# Patient Record
Sex: MALE | Race: WHITE
[De-identification: names, ages, dates, MRNs, and addresses within clinical notes are randomized per-mention and may not be internally consistent; named-entity substitution may affect disease eponyms.]

---
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
## 2018-01-05 ENCOUNTER — HOSPITAL ENCOUNTER (EMERGENCY)
Dept: HOSPITAL 8 - ED | Age: 18
Discharge: LEFT BEFORE DISCHARGE | End: 2018-01-05
Payer: SELF-PAY

## 2018-01-05 VITALS — DIASTOLIC BLOOD PRESSURE: 74 MMHG | SYSTOLIC BLOOD PRESSURE: 107 MMHG

## 2018-01-05 VITALS — HEIGHT: 67 IN | WEIGHT: 115.74 LBS | BODY MASS INDEX: 18.17 KG/M2

## 2018-01-05 DIAGNOSIS — R10.9: Primary | ICD-10-CM

## 2018-01-05 LAB
ALBUMIN SERPL-MCNC: 3.9 G/DL (ref 3.4–5)
ALP SERPL-CCNC: 83 U/L (ref 45–117)
ALT SERPL-CCNC: 20 U/L (ref 12–78)
ANION GAP SERPL CALC-SCNC: 10 MMOL/L (ref 5–15)
BASOPHILS # BLD AUTO: 0.04 X10^3/UL (ref 0–0.3)
BASOPHILS NFR BLD AUTO: 1 % (ref 0–1)
BILIRUB SERPL-MCNC: 0.5 MG/DL (ref 0.2–1)
CALCIUM SERPL-MCNC: 8.6 MG/DL (ref 8.5–10.1)
CHLORIDE SERPL-SCNC: 99 MMOL/L (ref 98–107)
CREAT SERPL-MCNC: 1.14 MG/DL (ref 0.7–1.3)
EOSINOPHIL # BLD AUTO: 0 X10^3/UL (ref 0–0.8)
EOSINOPHIL NFR BLD AUTO: 0 % (ref 1–7)
ERYTHROCYTE [DISTWIDTH] IN BLOOD BY AUTOMATED COUNT: 12.6 % (ref 9.4–14.8)
LYMPHOCYTES # BLD AUTO: 1.49 X10^3/UL (ref 1–6.1)
LYMPHOCYTES NFR BLD AUTO: 43 % (ref 22–44)
MCH RBC QN AUTO: 31.5 PG (ref 27.5–34.5)
MCHC RBC AUTO-ENTMCNC: 34.5 G/DL (ref 33.2–36.2)
MCV RBC AUTO: 91.3 FL (ref 81–97)
MD: NO
MONOCYTES # BLD AUTO: 0.33 X10^3/UL (ref 0–1.4)
MONOCYTES NFR BLD AUTO: 9 % (ref 2–9)
NEUTROPHILS # BLD AUTO: 1.63 X10^3/UL (ref 1.8–8)
NEUTROPHILS NFR BLD AUTO: 47 % (ref 42–75)
PLATELET # BLD AUTO: 210 X10^3/UL (ref 130–400)
PMV BLD AUTO: 8 FL (ref 7.4–10.4)
PROT SERPL-MCNC: 7.8 G/DL (ref 6.4–8.2)
RBC # BLD AUTO: 5.4 X10^6/UL (ref 4.38–5.82)

## 2018-01-05 PROCEDURE — 99284 EMERGENCY DEPT VISIT MOD MDM: CPT

## 2018-01-05 PROCEDURE — 83690 ASSAY OF LIPASE: CPT

## 2018-01-05 PROCEDURE — 85025 COMPLETE CBC W/AUTO DIFF WBC: CPT

## 2018-01-05 PROCEDURE — 36415 COLL VENOUS BLD VENIPUNCTURE: CPT

## 2018-01-05 PROCEDURE — 80053 COMPREHEN METABOLIC PANEL: CPT

## 2019-06-24 ENCOUNTER — HOSPITAL ENCOUNTER (EMERGENCY)
Facility: MEDICAL CENTER | Age: 19
End: 2019-06-24
Attending: EMERGENCY MEDICINE

## 2019-06-24 VITALS
RESPIRATION RATE: 16 BRPM | TEMPERATURE: 97.7 F | BODY MASS INDEX: 19.91 KG/M2 | HEART RATE: 90 BPM | DIASTOLIC BLOOD PRESSURE: 70 MMHG | OXYGEN SATURATION: 94 % | WEIGHT: 119.49 LBS | HEIGHT: 65 IN | SYSTOLIC BLOOD PRESSURE: 110 MMHG

## 2019-06-24 DIAGNOSIS — J02.9 PHARYNGITIS, UNSPECIFIED ETIOLOGY: ICD-10-CM

## 2019-06-24 LAB
S PYO AG THROAT QL: NORMAL
SIGNIFICANT IND 70042: NORMAL
SITE SITE: NORMAL
SOURCE SOURCE: NORMAL

## 2019-06-24 PROCEDURE — 87880 STREP A ASSAY W/OPTIC: CPT

## 2019-06-24 PROCEDURE — 87081 CULTURE SCREEN ONLY: CPT

## 2019-06-24 PROCEDURE — 99284 EMERGENCY DEPT VISIT MOD MDM: CPT

## 2019-06-24 PROCEDURE — 700102 HCHG RX REV CODE 250 W/ 637 OVERRIDE(OP): Performed by: EMERGENCY MEDICINE

## 2019-06-24 PROCEDURE — A9270 NON-COVERED ITEM OR SERVICE: HCPCS | Performed by: EMERGENCY MEDICINE

## 2019-06-24 RX ORDER — IBUPROFEN 200 MG
400 TABLET ORAL EVERY 6 HOURS PRN
Status: SHIPPED | COMMUNITY
End: 2020-09-20

## 2019-06-24 RX ORDER — IBUPROFEN 600 MG/1
600 TABLET ORAL ONCE
Status: COMPLETED | OUTPATIENT
Start: 2019-06-24 | End: 2019-06-24

## 2019-06-24 RX ADMIN — IBUPROFEN 600 MG: 600 TABLET ORAL at 15:22

## 2019-06-24 NOTE — ED TRIAGE NOTES
Chief Complaint   Patient presents with   • Flu Like Symptoms     2-3 days of a sore throat, stiff neck and fevers

## 2019-06-24 NOTE — ED NOTES
Patient in stable condition. No signs of distress. Patient pain free. Patient ambulatory out of ED to personal vehicle with stable gait with family.

## 2019-06-24 NOTE — ED PROVIDER NOTES
"ED Provider Note    CHIEF COMPLAINT  Chief Complaint   Patient presents with   • Flu Like Symptoms     2-3 days of a sore throat, stiff neck and fevers       HPI  Kennedy Stanton is a 18 y.o. male who presents with a report of 3 days of sore throat, somewhat stiff neck and occasional subjective fevers.  There is been no photophobia, vomiting or headache of significance.  Patient presents with a heart rate of 91 and says that he otherwise feels pretty good.  Denies any other complaints such as runny nose or congestion    REVIEW OF SYSTEMS  See HPI for further details. All other systems are negative.     PAST MEDICAL HISTORY  No past medical history on file.    FAMILY HISTORY  Family History   Problem Relation Age of Onset   • Arthritis Mother         RA   • No Known Problems Father    • No Known Problems Brother    • No Known Problems Brother        SOCIAL HISTORY   reports that he has never smoked. He uses smokeless tobacco. He reports that he does not drink alcohol or use drugs.    SURGICAL HISTORY  Past Surgical History:   Procedure Laterality Date   • COLONOSCOPY  9 y.o.    due to rectal bleeding - no abnormality found       CURRENT MEDICATIONS  Home Medications    **Home medications have not yet been reviewed for this encounter**         ALLERGIES  Allergies   Allergen Reactions   • Penicillins        PHYSICAL EXAM  VITAL SIGNS: /79   Pulse 91   Temp 37.1 °C (98.7 °F) (Temporal)   Resp 16   Ht 1.651 m (5' 5\")   Wt 54.2 kg (119 lb 7.8 oz)   SpO2 94%   BMI 19.88 kg/m²    Constitutional: Well developed, Well nourished, No acute distress, Non-toxic appearance.   HENT: Normocephalic, Atraumatic, Bilateral external ears normal, Oropharynx is mildly erythematous with clear mucous membranes are moist. No oral exudates or nasal discharge.   Eyes: Pupils are equal round and reactive, EOMI, Conjunctiva normal, No discharge.   Neck: Normal range of motion, minimal tenderness, Supple, No stridor. No " meningismus.  Lymphatic: No submandibular lymphadenopathy noted.   Cardiovascular: Regular rate and rhythm without murmur rub or gallop.  Thorax & Lungs: Clear breath sounds bilaterally without wheezes, rhonchi or rales. There is no chest wall tenderness.   Abdomen: Soft non-tender non-distended. There is no rebound or guarding. No organomegaly is appreciated. Bowel sounds are normal.  Skin: Normal without rash.   Back: No CVA or spinal tenderness.   Extremities: Intact distal pulses, No edema, No tenderness, No cyanosis, No clubbing. Capillary refill is less than 2 seconds.  Musculoskeletal: Good range of motion in all major joints. No tenderness to palpation or major deformities noted.   Neurologic: Alert & oriented x 3, Normal motor function, Normal sensory function, No focal deficits noted. Reflexes are normal.  Psychiatric: Affect normal, Judgment normal, Mood normal. There is no suicidal ideation or patient reported hallucinations.       COURSE & MEDICAL DECISION MAKING  Pertinent Labs & Imaging studies reviewed. (See chart for details)  Patient has a pharyngitis of questionable etiology.  I doubt retropharyngeal abscess or meningitis based on presentation.  The patient is fairly comfortable.  I ordered 600 mg of ibuprofen which seemed to take the edge of his sore throat.  I did obtain a rapid strep which is negative and this is sent for culture.  I think he has a viral pharyngitis which should self resolve and the patient and his grandmother understand that antibiotics are not indicated.  He will copiously hydrated, salt water gargle, ibuprofen for pain return if any significant change in symptoms    FINAL IMPRESSION  1. Pharyngitis, unspecified etiology             Electronically signed by: Stephan Gandhi, 6/24/2019 3:06 PM

## 2019-06-24 NOTE — ED NOTES
Med rec updated and complete  Allergies reviewed  Interviewed pt with grandmother at bedside with permission from pt.  Pt reports no prescription medications.  Pt reports no antibiotics in the last 2 weeks.

## 2019-06-27 LAB
S PYO SPEC QL CULT: NORMAL
SIGNIFICANT IND 70042: NORMAL
SITE SITE: NORMAL
SOURCE SOURCE: NORMAL

## 2020-09-20 ENCOUNTER — APPOINTMENT (OUTPATIENT)
Dept: RADIOLOGY | Facility: MEDICAL CENTER | Age: 20
End: 2020-09-20
Attending: EMERGENCY MEDICINE

## 2020-09-20 ENCOUNTER — HOSPITAL ENCOUNTER (EMERGENCY)
Facility: MEDICAL CENTER | Age: 20
End: 2020-09-20
Attending: EMERGENCY MEDICINE

## 2020-09-20 VITALS
HEART RATE: 81 BPM | OXYGEN SATURATION: 100 % | DIASTOLIC BLOOD PRESSURE: 62 MMHG | TEMPERATURE: 97.7 F | RESPIRATION RATE: 16 BRPM | SYSTOLIC BLOOD PRESSURE: 100 MMHG

## 2020-09-20 DIAGNOSIS — S90.32XA CONTUSION OF LEFT FOOT, INITIAL ENCOUNTER: ICD-10-CM

## 2020-09-20 PROCEDURE — 73630 X-RAY EXAM OF FOOT: CPT | Mod: LT

## 2020-09-20 PROCEDURE — 99284 EMERGENCY DEPT VISIT MOD MDM: CPT

## 2020-09-20 RX ORDER — LORATADINE 10 MG/1
10 TABLET ORAL PRN
Status: SHIPPED | COMMUNITY
End: 2021-08-21

## 2020-09-20 NOTE — ED TRIAGE NOTES
Chief Complaint   Patient presents with   • Foot Injury      pt states he ran over his foot with a power generator 2 days ago. complains of pain on left ankle. Swelling noted, no open wounds. CMS intact. Unable to bear weight.     Negative covid screen.

## 2020-09-20 NOTE — ED PROVIDER NOTES
ED Provider Note    ED Provider    Means of arrival: Private vehicle  History obtained from: Patient  History limited by: None    CHIEF COMPLAINT  Chief Complaint   Patient presents with   • Foot Injury       HPI  Kennedy Stanton is a 19 y.o. male who presents with complaints of left foot pain.  Yesterday he was walking down a hill pulling a generator behind him and the generator ran over his foot.  Complains of pain on the medial aspect of the left foot, just above the arch.  Pain is worse with weightbearing.  He describes tingling of the foot.    REVIEW OF SYSTEMS  See HPI for further details.     PAST MEDICAL HISTORY       SOCIAL HISTORY  Social History     Tobacco Use   • Smoking status: Never Smoker   • Smokeless tobacco: Current User   • Tobacco comment: vapor   Substance and Sexual Activity   • Alcohol use: No     Alcohol/week: 0.0 oz   • Drug use: No   • Sexual activity: Never       SURGICAL HISTORY   has a past surgical history that includes colonoscopy (9 y.o.).    CURRENT MEDICATIONS  Home Medications     Reviewed by Alexis White (Pharmacy Tech) on 09/20/20 at 1612  Med List Status: Complete   Medication Last Dose Status   loratadine (CLARITIN) 10 MG Tab 9/19/2020 Active                ALLERGIES  Allergies   Allergen Reactions   • Penicillins Rash     Pt reports that he gets a rash all over his body       PHYSICAL EXAM  VITAL SIGNS: /62   Pulse 81   Temp 36.5 °C (97.7 °F) (Temporal)   Resp 16   SpO2 100%   Constitutional: Alert in no apparent distress.  HENT: Normocephalic, Atraumatic, Mucous membranes are moist  Eyes:  Conjunctiva normal, non-icteric.   Heart: no peripheral cyanosis  Lungs: respirations are even and unlabored  Skin: Warm, Dry,normal color  Neurologic: Alert, Grossly non-focal.   Psychiatric: Affect normal, Judgment normal, Mood normal, Appears appropriate and not intoxicated.   Extremity: Left foot has ecchymosis and tenderness at the medial aspect just  above the arch.  There is no tenderness on the malleoli.  Distal neurovascular is normal  MEDICAL DECISION MAKING  This is a 19 y.o. male who presents injury to left foot, there is no deformity, x-rays to be done to evaluate for fracture, or dislocation    DIAGNOSTIC STUDIES / PROCEDURES    LABS      RADIOLOGY  DX-FOOT-COMPLETE 3+ LEFT   Final Result      No acute osseous abnormality.      X-ray was independently visualized by me    COURSE  Pertinent Labs & Imaging studies reviewed. (See chart for details)    4:06 PM - Patient seen and examined at bedside. Discussed plan of care,        FINAL IMPRESSION  1. Contusion of left foot, initial encounter        The note accurately reflects work and decisions made by me.  Burke Loza D.O.  9/20/2020  5:35 PM

## 2020-09-21 NOTE — ED NOTES
Dc instructions reviewed with pt, to f/u with pcp, , ice, elevate and use otc meds for pain /swelling. Return for worsening s/s

## 2021-08-21 ENCOUNTER — OFFICE VISIT (OUTPATIENT)
Dept: URGENT CARE | Facility: CLINIC | Age: 21
End: 2021-08-21

## 2021-08-21 VITALS
HEIGHT: 67 IN | WEIGHT: 122 LBS | OXYGEN SATURATION: 100 % | TEMPERATURE: 96.9 F | SYSTOLIC BLOOD PRESSURE: 106 MMHG | DIASTOLIC BLOOD PRESSURE: 76 MMHG | RESPIRATION RATE: 16 BRPM | HEART RATE: 70 BPM | BODY MASS INDEX: 19.15 KG/M2

## 2021-08-21 DIAGNOSIS — M62.838 MUSCLE SPASMS OF NECK: ICD-10-CM

## 2021-08-21 PROBLEM — S62.009A FRACTURE OF SCAPHOID BONE OF WRIST: Status: ACTIVE | Noted: 2017-06-15

## 2021-08-21 PROCEDURE — 99203 OFFICE O/P NEW LOW 30 MIN: CPT | Performed by: PHYSICIAN ASSISTANT

## 2021-08-21 ASSESSMENT — ENCOUNTER SYMPTOMS
DOUBLE VISION: 0
SEIZURES: 0
COUGH: 0
NAUSEA: 0
FEVER: 0
VOMITING: 0
BLURRED VISION: 0
SPEECH CHANGE: 0
FOCAL WEAKNESS: 0
SENSORY CHANGE: 0
SHORTNESS OF BREATH: 0
WEAKNESS: 0
PALPITATIONS: 0
ABDOMINAL PAIN: 0
CHILLS: 0
TREMORS: 0
HEADACHES: 0
TINGLING: 0
CLAUDICATION: 0
DIZZINESS: 0
NECK PAIN: 1
LOSS OF CONSCIOUSNESS: 0
DIARRHEA: 0
ORTHOPNEA: 0

## 2021-08-21 NOTE — PROGRESS NOTES
Subjective:   Kennedy Stanton is a 20 y.o. male who presents for Neck Injury (x 1 hour, patient felt pop in throat this morning while smoking, instantaneous 6/10 pain that radiated up into ear, 10/10 pain with talking or swallowing, pain has subsided somewhat, tenderness on palpation in area)      Kennedy Stanton is a 20 y.o. male who presents for neck pain x 1 hour, patient felt pop in throat this morning while smoking, instantaneous 6/10 pain that radiated up into ear, 10/10 pain with talking or swallowing, pain has subsided somewhat, tenderness on palpation in area.      Review of Systems   Constitutional: Negative for chills and fever.   Eyes: Negative for blurred vision and double vision.   Respiratory: Negative for cough and shortness of breath.    Cardiovascular: Negative for chest pain, palpitations, orthopnea, claudication and leg swelling.   Gastrointestinal: Negative for abdominal pain, diarrhea, nausea and vomiting.   Musculoskeletal: Positive for neck pain.   Skin: Negative for rash.   Neurological: Negative for dizziness, tingling, tremors, sensory change, speech change, focal weakness, seizures, loss of consciousness, weakness and headaches.   All other systems reviewed and are negative.      Medications:    • This patient does not have an active medication from one of the medication groupers.    Allergies: Penicillins    Problem List: Kennedy Stanton does not have a problem list on file.    Surgical History:  Past Surgical History:   Procedure Laterality Date   • COLONOSCOPY  9 y.o.    due to rectal bleeding - no abnormality found       Past Social Hx: Kennedy Stanton  reports that he has never smoked. He uses smokeless tobacco. He reports that he does not drink alcohol and does not use drugs.     Past Family Hx:  Kennedy Stanton family history includes Arthritis in his mother; No Known Problems in his brother, brother, and father.  "    Problem list, medications, and allergies reviewed by myself today in Epic.     Objective:     Blood Pressure 106/76 (BP Location: Right arm, Patient Position: Sitting, BP Cuff Size: Adult)   Pulse 70   Temperature 36.1 °C (96.9 °F) (Temporal)   Respiration 16   Height 1.702 m (5' 7\")   Weight 55.3 kg (122 lb)   Oxygen Saturation 100%   Body Mass Index 19.11 kg/m²     Physical Exam  Vitals reviewed.   Constitutional:       General: He is not in acute distress.     Appearance: Normal appearance. He is not ill-appearing, toxic-appearing or diaphoretic.   HENT:      Head: Normocephalic and atraumatic.   Neck:      Vascular: No carotid bruit.      Comments: PTP of the left mid sternal clinoid mastoid muscle.  No focal midline tenderness of the cervical spine.  No step-offs appreciated.  Flexion, extension, lateral bend and rotation is limited due to pain.  Cardiovascular:      Rate and Rhythm: Normal rate.      Heart sounds: Normal heart sounds.   Pulmonary:      Effort: Pulmonary effort is normal.   Musculoskeletal:         General: Normal range of motion.      Cervical back: Normal range of motion. No rigidity. Muscular tenderness present.   Lymphadenopathy:      Cervical: No cervical adenopathy.   Skin:     General: Skin is warm.      Findings: No rash.   Neurological:      General: No focal deficit present.      Mental Status: He is alert and oriented to person, place, and time. Mental status is at baseline.      Cranial Nerves: No cranial nerve deficit.      Sensory: No sensory deficit.      Motor: No weakness.      Coordination: Coordination normal.      Gait: Gait normal.      Deep Tendon Reflexes: Reflexes normal.      Comments: Motor and sensory of C-Spine    Deltoid/biceps brachii(C5): Intact  Radial wrist extensor(C6): Intact  Triceps brachii/flexor carpi radialis(C7): Intact  Flexor Digitorum Superficialis (C8): Intact    Special Test    Brudzinski-Kernig: N/A  Spurling: N/A   Psychiatric:         " Mood and Affect: Mood normal.         Behavior: Behavior normal.         Thought Content: Thought content normal.         Judgment: Judgment normal.         Assessment/Plan:     Medical Decision Making/Comments     Patient is a 20-year-old male who presents for evaluation of acute neck pain.  He states that his neck was contorted in an awkward position while he was sleeping and felt immediate pain in his left side of his neck.  Pain radiated to his left ear.  He denies any syncope, headache change in vision.  Pain has receded significantly since the incident.  On exam he has palpable pain of the sternocleidomastoid muscle with good range of motion.  No carotid bruits were appreciated.  No emphysema under the skin was appreciated.  Likely muscle spasm.  ED precautions discussed.   Diagnosis and associated orders     1. Muscle spasms of neck       -Protection/compression  -Rest: activity as tolerated  -Ice: Ice first 3-7 days.  20 min off/on  -Elevation  -NSAIDs/Acetomenophen as needed             Differential diagnosis, natural history, supportive care, and indications for immediate follow-up discussed.    Advised the patient to follow-up with the primary care physician for recheck, reevaluation, and consideration of further management.    Please note that this dictation was created using voice recognition software. I have made a reasonable attempt to correct obvious errors, but I expect that there are errors of grammar and possibly content that I did not discover before finalizing the note.

## 2022-01-31 ENCOUNTER — OFFICE VISIT (OUTPATIENT)
Dept: URGENT CARE | Facility: CLINIC | Age: 22
End: 2022-01-31
Payer: COMMERCIAL

## 2022-01-31 VITALS
DIASTOLIC BLOOD PRESSURE: 58 MMHG | BODY MASS INDEX: 19.46 KG/M2 | OXYGEN SATURATION: 98 % | HEIGHT: 67 IN | WEIGHT: 124 LBS | RESPIRATION RATE: 16 BRPM | SYSTOLIC BLOOD PRESSURE: 110 MMHG | TEMPERATURE: 99.2 F | HEART RATE: 94 BPM

## 2022-01-31 DIAGNOSIS — R94.31 NONSPECIFIC ABNORMAL ELECTROCARDIOGRAM (ECG) (EKG): ICD-10-CM

## 2022-01-31 DIAGNOSIS — F41.9 ANXIETY: ICD-10-CM

## 2022-01-31 DIAGNOSIS — G47.9 DIFFICULTY SLEEPING: ICD-10-CM

## 2022-01-31 DIAGNOSIS — R00.0 RACING HEART BEAT: ICD-10-CM

## 2022-01-31 PROCEDURE — 93000 ELECTROCARDIOGRAM COMPLETE: CPT | Performed by: PHYSICIAN ASSISTANT

## 2022-01-31 PROCEDURE — 99215 OFFICE O/P EST HI 40 MIN: CPT | Performed by: PHYSICIAN ASSISTANT

## 2022-01-31 RX ORDER — HYDROXYZINE HYDROCHLORIDE 25 MG/1
25 TABLET, FILM COATED ORAL NIGHTLY
Qty: 30 TABLET | Refills: 0 | Status: SHIPPED | OUTPATIENT
Start: 2022-01-31 | End: 2022-03-02

## 2022-01-31 ASSESSMENT — ANXIETY QUESTIONNAIRES
3. WORRYING TOO MUCH ABOUT DIFFERENT THINGS: MORE THAN HALF THE DAYS
1. FEELING NERVOUS, ANXIOUS, OR ON EDGE: MORE THAN HALF THE DAYS
7. FEELING AFRAID AS IF SOMETHING AWFUL MIGHT HAPPEN: NEARLY EVERY DAY
2. NOT BEING ABLE TO STOP OR CONTROL WORRYING: MORE THAN HALF THE DAYS
4. TROUBLE RELAXING: NEARLY EVERY DAY
IF YOU CHECKED OFF ANY PROBLEMS ON THIS QUESTIONNAIRE, HOW DIFFICULT HAVE THESE PROBLEMS MADE IT FOR YOU TO DO YOUR WORK, TAKE CARE OF THINGS AT HOME, OR GET ALONG WITH OTHER PEOPLE: SOMEWHAT DIFFICULT
5. BEING SO RESTLESS THAT IT IS HARD TO SIT STILL: NOT AT ALL
6. BECOMING EASILY ANNOYED OR IRRITABLE: MORE THAN HALF THE DAYS
GAD7 TOTAL SCORE: 14

## 2022-02-01 ASSESSMENT — ENCOUNTER SYMPTOMS
DEPRESSION: 0
DIZZINESS: 0
COUGH: 0
NERVOUS/ANXIOUS: 1
PALPITATIONS: 1
HEADACHES: 0
INSOMNIA: 1
SHORTNESS OF BREATH: 1

## 2022-02-01 NOTE — PROGRESS NOTES
"Subjective:   Kennedy Stanton is a 21 y.o. male who presents for Muscle Pain (x 1 week, had a back spasm, dx with anxiety, feels that he cannot breathe, shaky as he was sleeping  x 2 days)        Patient presents with concerns of anxiety, difficulty sleeping, and intermittent episodes of racing heart/muscle spasms/chest pain that have been ongoing for the last 7 to 10 days.  States his anxiety started after he experienced a back spasm, rapid heart rate, and shortness of breath suddenly at work.  He was checked out by paramedics onsite and was told his EKG was within normal limits.  His vital signs were stable at the time and he declined transport to the ER.  He had a similar episode 2 days ago that woke him from sleep.  He states that he felt a \"jolt\" in his chest.  Symptoms passed after a couple minutes.  Since then he has been experiencing anxiety that another episode will occur and has been experiencing difficulty sleeping.  He has difficulty both falling asleep and staying asleep.  He estimates that he is sleeping 6 hours per night.  Denies similar symptoms in the past and denies history of cardiac issues and thyroid issues.  He does have upcoming appointment with new PCP next week.  He is not taking any medications for symptoms.  He has been trying to improve his sleep habits by cutting down on screen time prior to bed-this is not helping.    Review of Systems   Respiratory: Positive for shortness of breath (currently resolved). Negative for cough.    Cardiovascular: Positive for chest pain (currently resolved) and palpitations (currently resolved). Negative for leg swelling.   Neurological: Negative for dizziness and headaches.   Psychiatric/Behavioral: Negative for depression and suicidal ideas. The patient is nervous/anxious and has insomnia.        PMH:  has no past medical history on file.  MEDS:   Current Outpatient Medications:   •  hydrOXYzine HCl (ATARAX) 25 MG Tab, Take 1 Tablet by mouth " "every evening for 30 days., Disp: 30 Tablet, Rfl: 0  ALLERGIES:   Allergies   Allergen Reactions   • Penicillins Rash     Pt reports that he gets a rash all over his body     SURGHX:   Past Surgical History:   Procedure Laterality Date   • COLONOSCOPY  9 y.o.    due to rectal bleeding - no abnormality found     SOCHX:  reports that he has never smoked. He uses smokeless tobacco. He reports that he does not drink alcohol and does not use drugs.  FH: Family history was reviewed, no pertinent findings to report   Objective:   /58 (BP Location: Left arm, Patient Position: Sitting, BP Cuff Size: Adult)   Pulse 94   Temp 37.3 °C (99.2 °F) (Temporal)   Resp 16   Ht 1.702 m (5' 7\")   Wt 56.2 kg (124 lb)   SpO2 98%   BMI 19.42 kg/m²   Physical Exam  Vitals reviewed.   Constitutional:       General: He is not in acute distress.     Appearance: Normal appearance. He is well-developed. He is not toxic-appearing.   HENT:      Head: Normocephalic and atraumatic.      Right Ear: External ear normal.      Left Ear: External ear normal.      Nose: Nose normal.   Cardiovascular:      Rate and Rhythm: Normal rate and regular rhythm.      Heart sounds: Normal heart sounds, S1 normal and S2 normal.   Pulmonary:      Effort: Pulmonary effort is normal. No respiratory distress.      Breath sounds: Normal breath sounds. No stridor. No decreased breath sounds, wheezing, rhonchi or rales.   Musculoskeletal:      Right lower leg: No edema.      Left lower leg: No edema.   Skin:     General: Skin is dry.   Neurological:      Comments: Alert and oriented.    Psychiatric:         Speech: Speech normal.         Behavior: Behavior normal.           EKG:  Comparison:   Rhythm: Sinus rhythm  Ectopy: None  Rate: 69  QRS Axis: Normal  Conduction:  Borderline shortened TN interval.  Notching at J-point in 2 3 and aVF.  ST segment: No ST segment elevation or depression noted  T waves: WNL      Assessment/Plan:   1. Nonspecific abnormal " electrocardiogram (ECG) (EKG)  - REFERRAL TO CARDIOLOGY    2. Racing heart beat  - EKG - Clinic Performed  - CBC WITH DIFFERENTIAL; Future  - Comp Metabolic Panel; Future  - TSH WITH REFLEX TO FT4; Future  - REFERRAL TO CARDIOLOGY    3. Anxiety  - CBC WITH DIFFERENTIAL; Future  - Comp Metabolic Panel; Future  - TSH WITH REFLEX TO FT4; Future  - hydrOXYzine HCl (ATARAX) 25 MG Tab; Take 1 Tablet by mouth every evening for 30 days.  Dispense: 30 Tablet; Refill: 0    4. Difficulty sleeping  - CBC WITH DIFFERENTIAL; Future  - Comp Metabolic Panel; Future  - TSH WITH REFLEX TO FT4; Future  - hydrOXYzine HCl (ATARAX) 25 MG Tab; Take 1 Tablet by mouth every evening for 30 days.  Dispense: 30 Tablet; Refill: 0    1. I am not convinced that this is the primary anxiety issue although this consideration was discussed.  Patient's anxiety began following episodes of palpitation, muscle spasm, shortness of breath.  One of these episodes woke him up from sleep.  His anxiety seems to primarily be stemming from concern that another one of these episodes will occur again.  I am concerned that he may be going into an arrhythmia during these episodes.  EKG demonstrates notching at the J-point in leads II, III and aVF-possible early repolarization syndrome?  Also referred to cardiology for further evaluation and management.  If Patient develops another episode I do recommend that he go to the emergency room for immediate medical reevaluation.    2.  DALLAS-7 score 14.  Again anxiety seems or associated with patient's acute episodes as previously detailed.  He does have follow-up with new PCP on 2/10/2022.  I would like him to be reevaluated at this appointment.  We will try him on Vistaril at nighttime for anxiety/sleep.  Discussed good sleep hygiene and continuing to avoid nighttime screen time. Labs to evaluate for underlying metabolic cause- review with PCP at upcoming appt.    Differential diagnosis, natural history, supportive care,  and indications for immediate follow-up discussed.    My total time spent caring for the patient on the day of the encounter was 45 minutes.   This does not include time spent on separately billable procedures/tests.

## 2022-02-02 ENCOUNTER — HOSPITAL ENCOUNTER (OUTPATIENT)
Dept: LAB | Facility: MEDICAL CENTER | Age: 22
End: 2022-02-02
Attending: PHYSICIAN ASSISTANT
Payer: COMMERCIAL

## 2022-02-02 DIAGNOSIS — G47.9 DIFFICULTY SLEEPING: ICD-10-CM

## 2022-02-02 DIAGNOSIS — R00.0 RACING HEART BEAT: ICD-10-CM

## 2022-02-02 DIAGNOSIS — F41.9 ANXIETY: ICD-10-CM

## 2022-02-02 LAB
ALBUMIN SERPL BCP-MCNC: 5 G/DL (ref 3.2–4.9)
ALBUMIN/GLOB SERPL: 2 G/DL
ALP SERPL-CCNC: 78 U/L (ref 30–99)
ALT SERPL-CCNC: 11 U/L (ref 2–50)
ANION GAP SERPL CALC-SCNC: 10 MMOL/L (ref 7–16)
AST SERPL-CCNC: 17 U/L (ref 12–45)
BASOPHILS # BLD AUTO: 0.4 % (ref 0–1.8)
BASOPHILS # BLD: 0.02 K/UL (ref 0–0.12)
BILIRUB SERPL-MCNC: 0.8 MG/DL (ref 0.1–1.5)
BUN SERPL-MCNC: 13 MG/DL (ref 8–22)
CALCIUM SERPL-MCNC: 9.7 MG/DL (ref 8.5–10.5)
CHLORIDE SERPL-SCNC: 104 MMOL/L (ref 96–112)
CO2 SERPL-SCNC: 25 MMOL/L (ref 20–33)
CREAT SERPL-MCNC: 0.94 MG/DL (ref 0.5–1.4)
EOSINOPHIL # BLD AUTO: 0.1 K/UL (ref 0–0.51)
EOSINOPHIL NFR BLD: 2.2 % (ref 0–6.9)
ERYTHROCYTE [DISTWIDTH] IN BLOOD BY AUTOMATED COUNT: 42.3 FL (ref 35.9–50)
FASTING STATUS PATIENT QL REPORTED: NORMAL
GLOBULIN SER CALC-MCNC: 2.5 G/DL (ref 1.9–3.5)
GLUCOSE SERPL-MCNC: 105 MG/DL (ref 65–99)
HCT VFR BLD AUTO: 49 % (ref 42–52)
HGB BLD-MCNC: 16.8 G/DL (ref 14–18)
IMM GRANULOCYTES # BLD AUTO: 0.02 K/UL (ref 0–0.11)
IMM GRANULOCYTES NFR BLD AUTO: 0.4 % (ref 0–0.9)
LYMPHOCYTES # BLD AUTO: 1.83 K/UL (ref 1–4.8)
LYMPHOCYTES NFR BLD: 40.8 % (ref 22–41)
MCH RBC QN AUTO: 31.7 PG (ref 27–33)
MCHC RBC AUTO-ENTMCNC: 34.3 G/DL (ref 33.7–35.3)
MCV RBC AUTO: 92.5 FL (ref 81.4–97.8)
MONOCYTES # BLD AUTO: 0.36 K/UL (ref 0–0.85)
MONOCYTES NFR BLD AUTO: 8 % (ref 0–13.4)
NEUTROPHILS # BLD AUTO: 2.15 K/UL (ref 1.82–7.42)
NEUTROPHILS NFR BLD: 48.2 % (ref 44–72)
NRBC # BLD AUTO: 0 K/UL
NRBC BLD-RTO: 0 /100 WBC
PLATELET # BLD AUTO: 260 K/UL (ref 164–446)
PMV BLD AUTO: 9.7 FL (ref 9–12.9)
POTASSIUM SERPL-SCNC: 4.3 MMOL/L (ref 3.6–5.5)
PROT SERPL-MCNC: 7.5 G/DL (ref 6–8.2)
RBC # BLD AUTO: 5.3 M/UL (ref 4.7–6.1)
SODIUM SERPL-SCNC: 139 MMOL/L (ref 135–145)
TSH SERPL DL<=0.005 MIU/L-ACNC: 1.27 UIU/ML (ref 0.38–5.33)
WBC # BLD AUTO: 4.5 K/UL (ref 4.8–10.8)

## 2022-02-02 PROCEDURE — 36415 COLL VENOUS BLD VENIPUNCTURE: CPT

## 2022-02-02 PROCEDURE — 85025 COMPLETE CBC W/AUTO DIFF WBC: CPT

## 2022-02-02 PROCEDURE — 80053 COMPREHEN METABOLIC PANEL: CPT

## 2022-02-02 PROCEDURE — 84443 ASSAY THYROID STIM HORMONE: CPT

## 2022-02-03 NOTE — RESULT ENCOUNTER NOTE
Contacted patient with results.  Patient's fasting blood glucose a touch elevated.  After discussing results with him he states that he was not fasting prior to labs being drawn.  Otherwise all labs with tenderness double limits.  No evidence of metabolic derangement.  Patient will follow up with his PCP as scheduled next week.  His cardiology referral still being processed.

## 2022-02-12 ENCOUNTER — APPOINTMENT (OUTPATIENT)
Dept: RADIOLOGY | Facility: IMAGING CENTER | Age: 22
End: 2022-02-12
Attending: STUDENT IN AN ORGANIZED HEALTH CARE EDUCATION/TRAINING PROGRAM
Payer: COMMERCIAL

## 2022-02-12 ENCOUNTER — OFFICE VISIT (OUTPATIENT)
Dept: URGENT CARE | Facility: CLINIC | Age: 22
End: 2022-02-12
Payer: COMMERCIAL

## 2022-02-12 VITALS
OXYGEN SATURATION: 97 % | HEIGHT: 67 IN | BODY MASS INDEX: 19.27 KG/M2 | DIASTOLIC BLOOD PRESSURE: 80 MMHG | TEMPERATURE: 97.3 F | RESPIRATION RATE: 16 BRPM | HEART RATE: 104 BPM | WEIGHT: 122.8 LBS | SYSTOLIC BLOOD PRESSURE: 116 MMHG

## 2022-02-12 DIAGNOSIS — M25.522 ELBOW PAIN, LEFT: ICD-10-CM

## 2022-02-12 PROCEDURE — 73080 X-RAY EXAM OF ELBOW: CPT | Mod: TC,LT | Performed by: RADIOLOGY

## 2022-02-12 PROCEDURE — 99213 OFFICE O/P EST LOW 20 MIN: CPT | Performed by: STUDENT IN AN ORGANIZED HEALTH CARE EDUCATION/TRAINING PROGRAM

## 2022-02-12 RX ORDER — DEXAMETHASONE SODIUM PHOSPHATE 10 MG/ML
10 INJECTION INTRAMUSCULAR; INTRAVENOUS ONCE
OUTPATIENT
Start: 2022-02-12 | End: 2022-02-15

## 2022-02-12 RX ORDER — ACETAMINOPHEN 120 MG/1
500 SUPPOSITORY RECTAL EVERY 4 HOURS PRN
COMMUNITY

## 2022-02-12 RX ORDER — KETOROLAC TROMETHAMINE 30 MG/ML
60 INJECTION, SOLUTION INTRAMUSCULAR; INTRAVENOUS ONCE
OUTPATIENT
Start: 2022-02-12 | End: 2022-02-15

## 2022-02-12 RX ORDER — METHYLPREDNISOLONE 4 MG/1
TABLET ORAL
Qty: 21 TABLET | Refills: 0 | Status: SHIPPED | OUTPATIENT
Start: 2022-02-12

## 2022-02-12 ASSESSMENT — FIBROSIS 4 INDEX: FIB4 SCORE: 0.45

## 2022-02-12 NOTE — PATIENT INSTRUCTIONS
RICE Therapy for Routine Care of Injuries  The routine care of many injuries includes rest, ice, compression, and elevation (RICE therapy). RICE therapy is often recommended for injuries to soft tissues, such as muscle strain, sprains, bruises, and overuse injuries. It can also be used for some bone injuries. Using RICE therapy can help to relieve pain and lessen swelling.  Supplies needed:  · Ice.  · Plastic bag.  · Towel.  · Elastic bandage.  · Pillow or pillows to raise (elevate) the injured body part.  How to care for your injury with RICE therapy    Rest  Rest your injury. This may help with the healing process. Rest usually involves limiting your normal activities and not using the injured part of your body. Generally, you can return to your normal activities when your health care provider says it is okay and you can do them without much discomfort.  If you rest the injury too much, it may not heal as well. Some injuries heal better with early movement instead of resting for too long. Talk with your health care provider about how you should limit your activities and whether you should start range-of-motion exercises for your injury.  Ice  Ice your injury to lessen swelling and pain. Do not apply ice directly to your skin.  · Put ice in a plastic bag.  · Place a towel between your skin and the bag.  · Leave the ice on for 20 minutes, 2-3 times a day. Use ice on as many days as told by your health care provider.  Compression  Put pressure (compression) on your injured area to control swelling, give support, and help with discomfort. Compression may be done with an elastic bandage. If an elastic bandage has been applied, follow these general tips:  · Use the bandage as directed by the maker of the bandage that you are using.  · Do not wrap the bandage too tightly. That may block (cut off) circulation in the arm or leg in the area below the bandage.  ? If part of your body beyond the bandage becomes blue, numb,  cold, swollen, or more painful, your bandage is probably too tight. If this occurs, remove your bandage and reapply it more loosely.  · Remove and reapply the bandage every 3-4 hours or as told by your health care provider.  · See your health care provider if the bandage seems to be making your problems worse rather than better.  Elevation  Elevate your injured area to lessen swelling and pain. If possible, elevate your injured area at or above the level of your heart or the center of your chest.  Contact a health care provider if:  · Your pain and swelling continue.  · Your symptoms are getting worse rather than improving.  Having these problems may mean that you need further evaluation or imaging tests, such as X-rays or an MRI. Sometimes, X-rays may not show a small broken bone (fracture) until days after the injury happened. Make a follow-up appointment with your health care provider. Ask your health care provider, or the department that is doing the imaging test, when your results will be ready.  Get help right away if:  · You have sudden severe pain at or below the area of your injury.  · You have redness or increased swelling around your injury.  · You have tingling or numbness at or below the area of your injury and it does not improve after you remove the elastic bandage.  Summary  · The routine care of many injuries includes rest, ice, compression, and elevation (RICE therapy). Using RICE therapy can help to relieve pain and lessen swelling.  · RICE therapy is often recommended for injuries to soft tissues, such as muscle strain, sprains, bruises, and overuse injuries. It can also be used for some bone injuries.  · Seek medical care if your pain and swelling continue or if your symptoms are getting worse rather than improving.  This information is not intended to replace advice given to you by your health care provider. Make sure you discuss any questions you have with your health care provider.  Document  Released: 04/01/2002 Document Revised: 09/07/2018 Document Reviewed: 09/07/2018  Elsevier Patient Education © 2020 [employer]

## 2022-02-12 NOTE — PROGRESS NOTES
"Subjective     Kennedy Stanton is a 21 y.o. male who presents with Elbow Pain (left x 2 days)            Patient is very agreeable 21-year-old male who presents to clinic after falling from a snowboard multiple times on his left elbow.  Patient endorses swelling and pain however denies loss of sensation denies loss of strength.  He presents to clinic for further evaluation the form of x-ray.      Review of Systems   Musculoskeletal: Positive for joint pain.        Left elbow pain   All other systems reviewed and are negative.             Objective     /80   Pulse (!) 104   Temp 36.3 °C (97.3 °F) (Temporal)   Resp 16   Ht 1.702 m (5' 7\")   Wt 55.7 kg (122 lb 12.8 oz)   SpO2 97%   BMI 19.23 kg/m²      Physical Exam  Vitals reviewed.   Musculoskeletal:      Comments: Patient has left elbow swelling and erythema no evidence of deformity mild tenderness per palpation.  Patient has sensation preserved in fingertips 5 out of 5  strength bilaterally.                             Assessment & Plan        1. Elbow pain, left  Patient with contusion of left elbow x-ray performed no evidence of acute fracture or dislocation.  Patient was counseled with regards to RICE therapy.  Plan:  1.  Decadron 10 mg intramuscular injection  2.  Toradol 60 mg intramuscular injection  3.  Medrol Dosepak    Patient was counseled alternate between ibuprofen and Tylenol for 5 days.  Patient was counseled not to take ibuprofen for any longer than 5 days as this could increase chances of stomach irritation and also injury to kidneys.  Patient endorsed understanding.  - DX-ELBOW-COMPLETE 3+ LEFT; Future  - ketorolac (TORADOL) injection 60 mg  - dexamethasone (DECADRON) injection (check route below) 10 mg  - methylPREDNISolone (MEDROL DOSEPAK) 4 MG Tablet Therapy Pack; Follow schedule on package instructions.  Dispense: 21 Tablet; Refill: 0                "